# Patient Record
Sex: FEMALE | Race: WHITE | NOT HISPANIC OR LATINO | URBAN - NONMETROPOLITAN AREA
[De-identification: names, ages, dates, MRNs, and addresses within clinical notes are randomized per-mention and may not be internally consistent; named-entity substitution may affect disease eponyms.]

---

## 2017-03-15 ENCOUNTER — OFFICE VISIT (OUTPATIENT)
Dept: RETAIL CLINIC | Facility: CLINIC | Age: 37
End: 2017-03-15

## 2017-03-15 VITALS
DIASTOLIC BLOOD PRESSURE: 60 MMHG | WEIGHT: 135.2 LBS | HEIGHT: 64 IN | TEMPERATURE: 99 F | HEART RATE: 93 BPM | BODY MASS INDEX: 23.08 KG/M2 | OXYGEN SATURATION: 100 % | SYSTOLIC BLOOD PRESSURE: 100 MMHG | RESPIRATION RATE: 18 BRPM

## 2017-03-15 DIAGNOSIS — J06.9 ACUTE URI: ICD-10-CM

## 2017-03-15 DIAGNOSIS — H65.02 ACUTE SEROUS OTITIS MEDIA OF LEFT EAR, RECURRENCE NOT SPECIFIED: Primary | ICD-10-CM

## 2017-03-15 PROCEDURE — 99203 OFFICE O/P NEW LOW 30 MIN: CPT | Performed by: NURSE PRACTITIONER

## 2017-03-15 RX ORDER — PREDNISONE 10 MG/1
TABLET ORAL
Qty: 39 TABLET | Refills: 0 | Status: SHIPPED | OUTPATIENT
Start: 2017-03-15

## 2017-03-15 NOTE — PATIENT INSTRUCTIONS
Serous Otitis Media  Serous otitis media is fluid in the middle ear space. This space contains the bones for hearing and air. Air in the middle ear space helps to transmit sound.   The air gets there through the eustachian tube. This tube goes from the back of the nose (nasopharynx) to the middle ear space. It keeps the pressure in the middle ear the same as the outside world. It also helps to drain fluid from the middle ear space.     Serous otitis media occurs when the eustachian tube gets blocked. Blockage can come from ear infections, colds, allergies, irritants such as cigarette smoke, sudden changes in air pressure (such as descending in an airplane), enlarged adenoids or a mass in the nasopharynx.    During colds and or after an ear infection, the middle ear space can become temporarily filled with fluid. Once the infection clears, the fluid will generally drain out of the ear through the eustachian tube. If it does not, then serous otitis media occurs.    Signs and symptoms may include hearing loss, and a feeling of fullness in the ear, without pain. Young children may not show any symptoms but may show slight behavioral changes, such as agitation, ear pulling, or crying.    For many people, the fluid in the middle ear goes away without treatment. If allergy is the cause, allergy treatment may be helpful. In certain situations, antibiotic medicines are used if the fluid has become infected.     You have been diagnosed with serous otitis media. Use all prescribed medications as directed. If the treatment provided for you is not helping your ear pain, or your ear pain is getting worse, please follow up with your primary care provider.      SEEK MEDICAL CARE IF:   · Your hearing is not better in 3 months.  · Your hearing is worse.  · You have ear pain.  · You have drainage from the ear.  · You have dizziness.          You have voiced an understanding of these instructions and a visit summary is provided.      This information is not intended to replace advice given to you by your health care provider. Make sure you discuss any questions you have with your health care provider.

## 2017-03-15 NOTE — PROGRESS NOTES
"Subjective   Emely Laws is a 36 y.o. female.     HPI Comments: Patient reports a 3 day history of nasal congestion, sore throat, low grade fever worse at night. Left ear pain and pain down left side of neck. Taking tylenol with little relief though none today. Had flu shot this season.     Earache    Associated symptoms include rhinorrhea and a sore throat. Pertinent negatives include no coughing, headaches or rash.   Sore Throat    Associated symptoms include congestion and ear pain. Pertinent negatives include no coughing, headaches or trouble swallowing.        No Known Allergies      The following portions of the patient's history were reviewed and updated as appropriate: allergies, current medications, past family history, past medical history, past social history, past surgical history and problem list.    Review of Systems   Constitutional: Positive for chills, fatigue and fever.   HENT: Positive for congestion, ear pain, postnasal drip, rhinorrhea and sore throat. Negative for facial swelling, sinus pressure and trouble swallowing.    Respiratory: Negative for cough and wheezing.    Skin: Negative for rash.   Neurological: Negative for dizziness and headaches.       Objective     Visit Vitals   • /60 (BP Location: Right arm)   • Pulse 93   • Temp 99 °F (37.2 °C) (Oral)   • Resp 18   • Ht 64\" (162.6 cm)   • Wt 135 lb 3.2 oz (61.3 kg)   • LMP 03/14/2017   • SpO2 100%   • BMI 23.21 kg/m2       Physical Exam  General Appearance:  Well-appearing, well-developed, well hydrated, well nourished, no acute distress, alert and oriented, appears stated age, comfortable, pleasant, cooperative  Head/face:  Normocephalic, atraumatic  Eyes:  Pupils equal, sclera clear, EOMI  Ears:  Bilateral TMs are dull gray with diffuse light reflex, left with air fluid level, canals are clear, no pinna or tragus tenderness with palpation  Nose/Sinuses:  Nares are mildly congested bilaterally  Mouth/Throat:  Posterior " pharynx is mildly erythematous with a small amount of clear drainage  Neck:  Supple without lymphadenopathy or thyromegaly; trachea is midline; tenderness along left eustachian tube area  Lungs:  Clear to auscultation bilaterally  Heart:  Regular rate and rhythm without murmur, gallop or rub  Neurologic:  Alert; no focal deficits; age appropriate behavior and speech      Assessment/Plan   Emely was seen today for earache and sore throat.    Diagnoses and all orders for this visit:    Acute serous otitis media of left ear, recurrence not specified    Acute URI    Other orders  -     loratadine-pseudoephedrine (CLARITIN-D 24-hour)  MG per 24 hr tablet; Take 1 tablet by mouth Daily for 10 days.  -     predniSONE (DELTASONE) 10 MG tablet; Take 3 tabs po bid for 3 days then 2 tabs po bid for 3 days then 1 tab po bid for 3 days then 1 tab po qd for 3 days then DC      Discussed dosing, side effects, recommended other symptomatic care.  Patient instructions and visit summary given as documented. Patient should follow up with primary care provider if symptoms worsen, fail to resolve or other symptoms need attention. Patient/family agree to the above.     Discussed with patient the importance of smoking cessation. Given handout regarding smoking cessation techniques and assistance.          AMOR Mukherjee